# Patient Record
Sex: MALE | Race: OTHER | HISPANIC OR LATINO | URBAN - METROPOLITAN AREA
[De-identification: names, ages, dates, MRNs, and addresses within clinical notes are randomized per-mention and may not be internally consistent; named-entity substitution may affect disease eponyms.]

---

## 2018-09-25 PROBLEM — Z00.00 ENCOUNTER FOR PREVENTIVE HEALTH EXAMINATION: Status: ACTIVE | Noted: 2018-09-25

## 2018-09-26 ENCOUNTER — OUTPATIENT (OUTPATIENT)
Dept: OUTPATIENT SERVICES | Facility: HOSPITAL | Age: 78
LOS: 1 days | End: 2018-09-26
Payer: COMMERCIAL

## 2018-09-26 DIAGNOSIS — I25.10 ATHEROSCLEROTIC HEART DISEASE OF NATIVE CORONARY ARTERY WITHOUT ANGINA PECTORIS: ICD-10-CM

## 2018-09-26 PROCEDURE — 78452 HT MUSCLE IMAGE SPECT MULT: CPT

## 2018-09-26 PROCEDURE — 93016 CV STRESS TEST SUPVJ ONLY: CPT

## 2018-09-26 PROCEDURE — 78452 HT MUSCLE IMAGE SPECT MULT: CPT | Mod: 26

## 2018-09-26 PROCEDURE — 93017 CV STRESS TEST TRACING ONLY: CPT

## 2018-09-26 PROCEDURE — A9500: CPT

## 2018-09-26 PROCEDURE — 93018 CV STRESS TEST I&R ONLY: CPT

## 2018-09-27 ENCOUNTER — APPOINTMENT (OUTPATIENT)
Dept: CV DIAGNOSITCS | Facility: HOSPITAL | Age: 78
End: 2018-09-27

## 2018-09-27 ENCOUNTER — APPOINTMENT (OUTPATIENT)
Dept: CV DIAGNOSTICS | Facility: HOSPITAL | Age: 78
End: 2018-09-27

## 2018-10-25 ENCOUNTER — OUTPATIENT (OUTPATIENT)
Dept: OUTPATIENT SERVICES | Facility: HOSPITAL | Age: 78
LOS: 1 days | End: 2018-10-25
Payer: COMMERCIAL

## 2018-10-25 VITALS
WEIGHT: 210.1 LBS | OXYGEN SATURATION: 98 % | DIASTOLIC BLOOD PRESSURE: 73 MMHG | TEMPERATURE: 98 F | HEIGHT: 67 IN | SYSTOLIC BLOOD PRESSURE: 168 MMHG | RESPIRATION RATE: 20 BRPM | HEART RATE: 82 BPM

## 2018-10-25 DIAGNOSIS — Z98.890 OTHER SPECIFIED POSTPROCEDURAL STATES: Chronic | ICD-10-CM

## 2018-10-25 DIAGNOSIS — R94.39 ABNORMAL RESULT OF OTHER CARDIOVASCULAR FUNCTION STUDY: ICD-10-CM

## 2018-10-25 LAB
ALBUMIN SERPL ELPH-MCNC: 4.7 G/DL — SIGNIFICANT CHANGE UP (ref 3.3–5)
ALP SERPL-CCNC: 70 U/L — SIGNIFICANT CHANGE UP (ref 40–120)
ALT FLD-CCNC: 39 U/L — SIGNIFICANT CHANGE UP (ref 10–45)
ANION GAP SERPL CALC-SCNC: 11 MMOL/L — SIGNIFICANT CHANGE UP (ref 5–17)
AST SERPL-CCNC: 35 U/L — SIGNIFICANT CHANGE UP (ref 10–40)
BILIRUB SERPL-MCNC: 0.5 MG/DL — SIGNIFICANT CHANGE UP (ref 0.2–1.2)
BUN SERPL-MCNC: 13 MG/DL — SIGNIFICANT CHANGE UP (ref 7–23)
CALCIUM SERPL-MCNC: 9.4 MG/DL — SIGNIFICANT CHANGE UP (ref 8.4–10.5)
CHLORIDE SERPL-SCNC: 102 MMOL/L — SIGNIFICANT CHANGE UP (ref 96–108)
CO2 SERPL-SCNC: 27 MMOL/L — SIGNIFICANT CHANGE UP (ref 22–31)
CREAT SERPL-MCNC: 1.16 MG/DL — SIGNIFICANT CHANGE UP (ref 0.5–1.3)
GLUCOSE SERPL-MCNC: 170 MG/DL — HIGH (ref 70–99)
HCT VFR BLD CALC: 37.5 % — LOW (ref 39–50)
HGB BLD-MCNC: 12.6 G/DL — LOW (ref 13–17)
MCHC RBC-ENTMCNC: 30 PG — SIGNIFICANT CHANGE UP (ref 27–34)
MCHC RBC-ENTMCNC: 33.5 GM/DL — SIGNIFICANT CHANGE UP (ref 32–36)
MCV RBC AUTO: 89.6 FL — SIGNIFICANT CHANGE UP (ref 80–100)
PLATELET # BLD AUTO: 226 K/UL — SIGNIFICANT CHANGE UP (ref 150–400)
POTASSIUM SERPL-MCNC: 4.8 MMOL/L — SIGNIFICANT CHANGE UP (ref 3.5–5.3)
POTASSIUM SERPL-SCNC: 4.8 MMOL/L — SIGNIFICANT CHANGE UP (ref 3.5–5.3)
PROT SERPL-MCNC: 7.8 G/DL — SIGNIFICANT CHANGE UP (ref 6–8.3)
RBC # BLD: 4.18 M/UL — LOW (ref 4.2–5.8)
RBC # FLD: 13.3 % — SIGNIFICANT CHANGE UP (ref 10.3–14.5)
SODIUM SERPL-SCNC: 140 MMOL/L — SIGNIFICANT CHANGE UP (ref 135–145)
WBC # BLD: 6 K/UL — SIGNIFICANT CHANGE UP (ref 3.8–10.5)
WBC # FLD AUTO: 6 K/UL — SIGNIFICANT CHANGE UP (ref 3.8–10.5)

## 2018-10-25 PROCEDURE — 93458 L HRT ARTERY/VENTRICLE ANGIO: CPT | Mod: 26,GC

## 2018-10-25 PROCEDURE — 93458 L HRT ARTERY/VENTRICLE ANGIO: CPT

## 2018-10-25 PROCEDURE — C1769: CPT

## 2018-10-25 PROCEDURE — 80053 COMPREHEN METABOLIC PANEL: CPT

## 2018-10-25 PROCEDURE — 85027 COMPLETE CBC AUTOMATED: CPT

## 2018-10-25 PROCEDURE — C1894: CPT

## 2018-10-25 PROCEDURE — C1887: CPT

## 2018-10-25 PROCEDURE — 99203 OFFICE O/P NEW LOW 30 MIN: CPT

## 2018-10-25 PROCEDURE — 93005 ELECTROCARDIOGRAM TRACING: CPT

## 2018-10-25 PROCEDURE — 93010 ELECTROCARDIOGRAM REPORT: CPT

## 2018-10-25 RX ORDER — INSULIN ASPART 100 [IU]/ML
16 INJECTION, SOLUTION SUBCUTANEOUS
Qty: 0 | Refills: 0 | COMMUNITY

## 2018-10-25 RX ORDER — INSULIN DEGLUDEC 100 U/ML
60 INJECTION, SOLUTION SUBCUTANEOUS
Qty: 0 | Refills: 0 | COMMUNITY

## 2018-10-25 RX ORDER — AMLODIPINE BESYLATE 2.5 MG/1
1 TABLET ORAL
Qty: 0 | Refills: 0 | COMMUNITY

## 2018-10-25 RX ORDER — METFORMIN HYDROCHLORIDE 850 MG/1
1 TABLET ORAL
Qty: 0 | Refills: 0 | COMMUNITY

## 2018-10-25 RX ORDER — SODIUM CHLORIDE 9 MG/ML
3 INJECTION INTRAMUSCULAR; INTRAVENOUS; SUBCUTANEOUS EVERY 8 HOURS
Qty: 0 | Refills: 0 | Status: DISCONTINUED | OUTPATIENT
Start: 2018-10-25 | End: 2018-11-09

## 2018-10-25 RX ORDER — ATORVASTATIN CALCIUM 80 MG/1
1 TABLET, FILM COATED ORAL
Qty: 0 | Refills: 0 | COMMUNITY

## 2018-10-25 RX ORDER — RAMIPRIL 5 MG
1 CAPSULE ORAL
Qty: 0 | Refills: 0 | COMMUNITY

## 2018-10-25 RX ORDER — TERAZOSIN HYDROCHLORIDE 10 MG/1
1 CAPSULE ORAL
Qty: 0 | Refills: 0 | COMMUNITY

## 2018-10-25 NOTE — H&P CARDIOLOGY - PMH
Benign prostatic hyperplasia    HLD (hyperlipidemia)    HTN (hypertension), benign    IDDM (insulin dependent diabetes mellitus)

## 2018-10-25 NOTE — H&P CARDIOLOGY - HISTORY OF PRESENT ILLNESS
79 y/o male with PMH of HTN, HLD, IDDM, BPH, now p/w GARZA, denies chest pain or dizziness , s/p stress test which reveals The left ventricle was normal in size. There is a medium-sized, mild defect in the mid to distal inferior  and inferoapical walls that is mostly reversible suggestive of mild ischemia with partial scarring. There is a small, mild defect in the basal to mid anteroseptal wall that is fixed suggestive of scar. Post-stress gated wall motion analysis was performed(LVEF = 64 %;LVEDV = 88 ml.) revealing reduced systolic thickening of the distal inferior, inferoapical, and basal to mid anteroseptal walls with normal overall left ventricular ejection fraction. (ef~64%) seen & evaluated by Dr Miles & now recommends for cardiac cath.

## 2022-12-13 ENCOUNTER — OFFICE (OUTPATIENT)
Dept: URBAN - METROPOLITAN AREA CLINIC 93 | Facility: CLINIC | Age: 82
Setting detail: OPHTHALMOLOGY
End: 2022-12-13
Payer: COMMERCIAL

## 2022-12-13 DIAGNOSIS — H40.1121: ICD-10-CM

## 2022-12-13 DIAGNOSIS — H43.393: ICD-10-CM

## 2022-12-13 DIAGNOSIS — E11.9: ICD-10-CM

## 2022-12-13 DIAGNOSIS — H43.21: ICD-10-CM

## 2022-12-13 DIAGNOSIS — H26.493: ICD-10-CM

## 2022-12-13 DIAGNOSIS — H40.1113: ICD-10-CM

## 2022-12-13 PROCEDURE — 99213 OFFICE O/P EST LOW 20 MIN: CPT | Performed by: OPHTHALMOLOGY

## 2022-12-13 ASSESSMENT — REFRACTION_AUTOREFRACTION
OS_CYLINDER: -0.50
OS_AXIS: 023
OS_SPHERE: +1.00
OD_SPHERE: UNABLE

## 2022-12-13 ASSESSMENT — AXIALLENGTH_DERIVED
OD_AL: 23.31
OD_AL: 25.3244
OS_AL: 23.9363
OS_AL: 24.1385

## 2022-12-13 ASSESSMENT — REFRACTION_CURRENTRX
OS_AXIS: 99
OD_SPHERE: +1.50
OD_ADD: +3.00
OS_ADD: +2.50
OD_OVR_VA: 20/
OS_OVR_VA: 20/
OD_OVR_VA: 20/
OS_VPRISM_DIRECTION: PROGS
OD_SPHERE: +2.00
OS_CYLINDER: -0.50
OS_SPHERE: +2.25
OS_SPHERE: +2.00
OD_ADD: +2.50
OS_ADD: +3.00
OS_AXIS: 95
OD_AXIS: 57
OS_OVR_VA: 20/
OD_VPRISM_DIRECTION: PROGS
OS_CYLINDER: -0.75
OD_CYLINDER: -0.50

## 2022-12-13 ASSESSMENT — REFRACTION_MANIFEST
OS_AXIS: 059
OS_SPHERE: +2.50
OD_AXIS: 045
OD_AXIS: 115
OD_SPHERE: -0.75
OD_CYLINDER: -3.25
OS_SPHERE: +1.50
OD_SPHERE: +2.75
OD_CYLINDER: -0.50
OS_CYLINDER: -0.50

## 2022-12-13 ASSESSMENT — SPHEQUIV_DERIVED
OS_SPHEQUIV: 0.75
OD_SPHEQUIV: 2.5
OD_SPHEQUIV: -2.375
OS_SPHEQUIV: 1.25

## 2022-12-13 ASSESSMENT — KERATOMETRY
OD_AXISANGLE_DEGREES: 115
OS_K1POWER_DIOPTERS: 42.00
OS_AXISANGLE_DEGREES: 093
OD_K1POWER_DIOPTERS: 43.00
OS_K2POWER_DIOPTERS: 40.50
OD_K2POWER_DIOPTERS: 40.25

## 2022-12-13 ASSESSMENT — PACHYMETRY
OS_CT_CORRECTION: -1
OD_CT_UM: 532
OS_CT_UM: 552
OD_CT_CORRECTION: 1

## 2022-12-13 ASSESSMENT — CONFRONTATIONAL VISUAL FIELD TEST (CVF)
OD_FINDINGS: FULL
OS_FINDINGS: FULL

## 2022-12-13 ASSESSMENT — TONOMETRY
OS_IOP_MMHG: 14
OD_IOP_MMHG: 10

## 2022-12-13 ASSESSMENT — VISUAL ACUITY
OD_BCVA: 20/25-1
OS_BCVA: 20/80-1

## 2023-03-24 ENCOUNTER — NON-APPOINTMENT (OUTPATIENT)
Age: 83
End: 2023-03-24

## 2023-04-14 ENCOUNTER — OFFICE (OUTPATIENT)
Facility: LOCATION | Age: 83
Setting detail: OPHTHALMOLOGY
End: 2023-04-14
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
## 2023-04-14 PROCEDURE — NO SHOW FE NO SHOW FEE: Performed by: OPHTHALMOLOGY

## 2024-05-06 ENCOUNTER — OFFICE (OUTPATIENT)
Facility: LOCATION | Age: 84
Setting detail: OPHTHALMOLOGY
End: 2024-05-06
Payer: COMMERCIAL

## 2024-05-06 DIAGNOSIS — H40.1113: ICD-10-CM

## 2024-05-06 DIAGNOSIS — H40.1121: ICD-10-CM

## 2024-05-06 PROCEDURE — 92083 EXTENDED VISUAL FIELD XM: CPT | Performed by: OPHTHALMOLOGY

## 2024-05-06 PROCEDURE — 92250 FUNDUS PHOTOGRAPHY W/I&R: CPT | Performed by: OPHTHALMOLOGY

## 2024-05-06 PROCEDURE — 92020 GONIOSCOPY: CPT | Performed by: OPHTHALMOLOGY

## 2024-05-06 PROCEDURE — 92014 COMPRE OPH EXAM EST PT 1/>: CPT | Performed by: OPHTHALMOLOGY

## 2024-05-06 ASSESSMENT — CONFRONTATIONAL VISUAL FIELD TEST (CVF)
OS_FINDINGS: FULL
OD_FINDINGS: FULL

## 2024-05-20 ENCOUNTER — OFFICE (OUTPATIENT)
Facility: LOCATION | Age: 84
Setting detail: OPHTHALMOLOGY
End: 2024-05-20
Payer: COMMERCIAL

## 2024-05-20 DIAGNOSIS — H40.1113: ICD-10-CM

## 2024-05-20 PROCEDURE — 92012 INTRM OPH EXAM EST PATIENT: CPT | Performed by: OPHTHALMOLOGY

## 2024-05-20 ASSESSMENT — CONFRONTATIONAL VISUAL FIELD TEST (CVF)
OD_FINDINGS: FULL
OS_FINDINGS: FULL

## 2024-06-05 ENCOUNTER — OFFICE (OUTPATIENT)
Facility: LOCATION | Age: 84
Setting detail: OPHTHALMOLOGY
End: 2024-06-05
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
## 2024-06-05 PROCEDURE — NO SHOW FE NO SHOW FEE: Performed by: OPHTHALMOLOGY

## 2024-07-03 ENCOUNTER — OFFICE (OUTPATIENT)
Facility: LOCATION | Age: 84
Setting detail: OPHTHALMOLOGY
End: 2024-07-03
Payer: COMMERCIAL

## 2024-07-03 DIAGNOSIS — H43.811: ICD-10-CM

## 2024-07-03 DIAGNOSIS — E11.9: ICD-10-CM

## 2024-07-03 DIAGNOSIS — H43.21: ICD-10-CM

## 2024-07-03 DIAGNOSIS — H40.1121: ICD-10-CM

## 2024-07-03 DIAGNOSIS — H40.1113: ICD-10-CM

## 2024-07-03 DIAGNOSIS — H43.393: ICD-10-CM

## 2024-07-03 DIAGNOSIS — H26.493: ICD-10-CM

## 2024-07-03 PROCEDURE — 99213 OFFICE O/P EST LOW 20 MIN: CPT | Performed by: OPHTHALMOLOGY

## 2024-07-03 ASSESSMENT — CONFRONTATIONAL VISUAL FIELD TEST (CVF)
OD_FINDINGS: FULL
OS_FINDINGS: FULL

## 2024-07-08 ENCOUNTER — OFFICE (OUTPATIENT)
Dept: URBAN - METROPOLITAN AREA CLINIC 77 | Facility: CLINIC | Age: 84
Setting detail: OPHTHALMOLOGY
End: 2024-07-08
Payer: COMMERCIAL

## 2024-07-08 DIAGNOSIS — H43.12: ICD-10-CM

## 2024-07-08 DIAGNOSIS — E11.3391: ICD-10-CM

## 2024-07-08 DIAGNOSIS — H43.21: ICD-10-CM

## 2024-07-08 DIAGNOSIS — E11.3592: ICD-10-CM

## 2024-07-08 DIAGNOSIS — H40.1113: ICD-10-CM

## 2024-07-08 PROCEDURE — 92235 FLUORESCEIN ANGRPH MLTIFRAME: CPT | Performed by: OPHTHALMOLOGY

## 2024-07-08 PROCEDURE — 92250 FUNDUS PHOTOGRAPHY W/I&R: CPT | Performed by: OPHTHALMOLOGY

## 2024-07-08 PROCEDURE — 67028 INJECTION EYE DRUG: CPT | Mod: LT | Performed by: OPHTHALMOLOGY

## 2024-07-08 PROCEDURE — 99214 OFFICE O/P EST MOD 30 MIN: CPT | Mod: 25 | Performed by: OPHTHALMOLOGY

## 2024-07-08 ASSESSMENT — CONFRONTATIONAL VISUAL FIELD TEST (CVF)
OS_FINDINGS: FULL
OD_FINDINGS: FULL

## 2024-07-19 ENCOUNTER — OFFICE (OUTPATIENT)
Facility: LOCATION | Age: 84
Setting detail: OPHTHALMOLOGY
End: 2024-07-19
Payer: COMMERCIAL

## 2024-07-19 DIAGNOSIS — H40.1121: ICD-10-CM

## 2024-07-19 DIAGNOSIS — H40.1113: ICD-10-CM

## 2024-07-19 PROCEDURE — 92083 EXTENDED VISUAL FIELD XM: CPT | Performed by: OPHTHALMOLOGY

## 2024-07-19 PROCEDURE — 76514 ECHO EXAM OF EYE THICKNESS: CPT | Performed by: OPHTHALMOLOGY

## 2024-07-19 PROCEDURE — 92012 INTRM OPH EXAM EST PATIENT: CPT | Performed by: OPHTHALMOLOGY

## 2024-07-19 PROCEDURE — 92133 CPTRZD OPH DX IMG PST SGM ON: CPT | Performed by: OPHTHALMOLOGY

## 2024-07-19 ASSESSMENT — CONFRONTATIONAL VISUAL FIELD TEST (CVF)
OD_FINDINGS: FULL
OS_FINDINGS: FULL

## 2024-07-22 ENCOUNTER — OFFICE (OUTPATIENT)
Dept: URBAN - METROPOLITAN AREA CLINIC 77 | Facility: CLINIC | Age: 84
Setting detail: OPHTHALMOLOGY
End: 2024-07-22
Payer: COMMERCIAL

## 2024-07-22 ENCOUNTER — RX ONLY (RX ONLY)
Age: 84
End: 2024-07-22

## 2024-07-22 DIAGNOSIS — H43.12: ICD-10-CM

## 2024-07-22 DIAGNOSIS — E11.3592: ICD-10-CM

## 2024-07-22 DIAGNOSIS — E11.3391: ICD-10-CM

## 2024-07-22 PROCEDURE — 92134 CPTRZ OPH DX IMG PST SGM RTA: CPT | Performed by: OPHTHALMOLOGY

## 2024-07-22 PROCEDURE — 67228 TREATMENT X10SV RETINOPATHY: CPT | Mod: LT | Performed by: OPHTHALMOLOGY

## 2024-07-22 ASSESSMENT — CONFRONTATIONAL VISUAL FIELD TEST (CVF)
OS_FINDINGS: FULL
OD_FINDINGS: FULL

## 2024-07-26 ENCOUNTER — OFFICE (OUTPATIENT)
Facility: LOCATION | Age: 84
Setting detail: OPHTHALMOLOGY
End: 2024-07-26
Payer: COMMERCIAL

## 2024-07-26 DIAGNOSIS — E11.3592: ICD-10-CM

## 2024-07-26 DIAGNOSIS — H40.1121: ICD-10-CM

## 2024-07-26 DIAGNOSIS — H43.12: ICD-10-CM

## 2024-07-26 DIAGNOSIS — E11.3391: ICD-10-CM

## 2024-07-26 DIAGNOSIS — H40.1113: ICD-10-CM

## 2024-07-26 PROCEDURE — 99213 OFFICE O/P EST LOW 20 MIN: CPT | Mod: 24 | Performed by: OPHTHALMOLOGY

## 2024-07-26 ASSESSMENT — CONFRONTATIONAL VISUAL FIELD TEST (CVF)
OS_FINDINGS: FULL
OD_FINDINGS: FULL

## 2024-08-26 ENCOUNTER — OFFICE (OUTPATIENT)
Dept: URBAN - METROPOLITAN AREA CLINIC 77 | Facility: CLINIC | Age: 84
Setting detail: OPHTHALMOLOGY
End: 2024-08-26
Payer: COMMERCIAL

## 2024-08-26 DIAGNOSIS — E11.3592: ICD-10-CM

## 2024-08-26 DIAGNOSIS — E11.3391: ICD-10-CM

## 2024-08-26 DIAGNOSIS — H43.12: ICD-10-CM

## 2024-08-26 PROCEDURE — 67228 TREATMENT X10SV RETINOPATHY: CPT | Mod: LT | Performed by: OPHTHALMOLOGY

## 2024-08-26 PROCEDURE — 92250 FUNDUS PHOTOGRAPHY W/I&R: CPT | Performed by: OPHTHALMOLOGY

## 2024-08-26 ASSESSMENT — CONFRONTATIONAL VISUAL FIELD TEST (CVF)
OD_FINDINGS: FULL
OS_FINDINGS: FULL

## 2024-09-16 ENCOUNTER — OFFICE (OUTPATIENT)
Dept: URBAN - METROPOLITAN AREA CLINIC 77 | Facility: CLINIC | Age: 84
Setting detail: OPHTHALMOLOGY
End: 2024-09-16
Payer: COMMERCIAL

## 2024-09-16 DIAGNOSIS — H43.12: ICD-10-CM

## 2024-09-16 DIAGNOSIS — H40.1121: ICD-10-CM

## 2024-09-16 DIAGNOSIS — H40.1113: ICD-10-CM

## 2024-09-16 DIAGNOSIS — E11.3391: ICD-10-CM

## 2024-09-16 DIAGNOSIS — E11.3592: ICD-10-CM

## 2024-09-16 DIAGNOSIS — H26.493: ICD-10-CM

## 2024-09-16 PROCEDURE — 67228 TREATMENT X10SV RETINOPATHY: CPT | Mod: LT | Performed by: OPHTHALMOLOGY

## 2024-09-16 PROCEDURE — 92134 CPTRZ OPH DX IMG PST SGM RTA: CPT | Performed by: OPHTHALMOLOGY

## 2024-09-16 PROCEDURE — 99213 OFFICE O/P EST LOW 20 MIN: CPT | Mod: 25 | Performed by: OPHTHALMOLOGY

## 2024-09-16 ASSESSMENT — CONFRONTATIONAL VISUAL FIELD TEST (CVF)
OS_FINDINGS: FULL
OD_FINDINGS: FULL

## 2024-09-25 ENCOUNTER — OFFICE (OUTPATIENT)
Facility: LOCATION | Age: 84
Setting detail: OPHTHALMOLOGY
End: 2024-09-25
Payer: COMMERCIAL

## 2024-09-25 DIAGNOSIS — H40.1113: ICD-10-CM

## 2024-09-25 DIAGNOSIS — H40.1121: ICD-10-CM

## 2024-09-25 PROCEDURE — 92020 GONIOSCOPY: CPT | Performed by: OPHTHALMOLOGY

## 2024-09-25 PROCEDURE — 99213 OFFICE O/P EST LOW 20 MIN: CPT | Mod: 24 | Performed by: OPHTHALMOLOGY

## 2024-09-25 ASSESSMENT — CONFRONTATIONAL VISUAL FIELD TEST (CVF)
OS_FINDINGS: FULL
OD_FINDINGS: FULL

## 2024-10-01 ENCOUNTER — OFFICE (OUTPATIENT)
Facility: LOCATION | Age: 84
Setting detail: OPHTHALMOLOGY
End: 2024-10-01
Payer: COMMERCIAL

## 2024-10-01 DIAGNOSIS — H40.1121: ICD-10-CM

## 2024-10-01 PROCEDURE — 65855 TRABECULOPLASTY LASER SURG: CPT | Mod: LT | Performed by: OPHTHALMOLOGY

## 2024-10-01 ASSESSMENT — REFRACTION_AUTOREFRACTION
OD_SPHERE: -0.75
OD_CYLINDER: -1.25
OS_SPHERE: +0.75
OD_AXIS: 024
OS_CYLINDER: -0.50
OS_AXIS: 014

## 2024-10-01 ASSESSMENT — KERATOMETRY
OS_K1POWER_DIOPTERS: 40.25
OS_AXISANGLE_DEGREES: 014
OD_K2POWER_DIOPTERS: 42.50
OD_AXISANGLE_DEGREES: 168
METHOD_AUTO_MANUAL: AUTO
OD_K1POWER_DIOPTERS: 41.50
OS_K2POWER_DIOPTERS: 42.25

## 2024-10-01 ASSESSMENT — CONFRONTATIONAL VISUAL FIELD TEST (CVF)
OS_FINDINGS: FULL
OD_FINDINGS: FULL

## 2024-10-01 ASSESSMENT — REFRACTION_CURRENTRX
OD_VPRISM_DIRECTION: PROGS
OD_ADD: +2.50
OS_CYLINDER: -0.50
OD_OVR_VA: 20/
OD_SPHERE: +2.00
OS_OVR_VA: 20/
OS_CYLINDER: -0.75
OD_AXIS: 57
OS_OVR_VA: 20/
OS_SPHERE: +2.00
OS_AXIS: 95
OS_ADD: +2.50
OD_OVR_VA: 20/
OD_ADD: +3.00
OD_CYLINDER: -0.50
OS_ADD: +3.00
OS_SPHERE: +2.25
OS_VPRISM_DIRECTION: PROGS
OD_SPHERE: +1.50
OS_AXIS: 99

## 2024-10-01 ASSESSMENT — REFRACTION_MANIFEST
OD_AXIS: 045
OS_SPHERE: +2.50
OS_AXIS: 059
OD_CYLINDER: -3.25
OS_SPHERE: +1.50
OD_SPHERE: +2.75
OD_AXIS: 115
OS_CYLINDER: -0.50
OD_SPHERE: -0.75
OD_CYLINDER: -0.50

## 2024-10-01 ASSESSMENT — PACHYMETRY
OD_CT_CORRECTION: 2
OD_CT_UM: 510
OS_CT_UM: 516
OS_CT_CORRECTION: 2

## 2024-10-01 ASSESSMENT — TONOMETRY
OS_IOP_MMHG: 14
OD_IOP_MMHG: 16

## 2024-10-01 ASSESSMENT — VISUAL ACUITY
OS_BCVA: 20/150
OD_BCVA: 20/30-2

## 2024-11-04 ENCOUNTER — OFFICE (OUTPATIENT)
Facility: LOCATION | Age: 84
Setting detail: OPHTHALMOLOGY
End: 2024-11-04
Payer: COMMERCIAL

## 2024-11-04 DIAGNOSIS — H40.1121: ICD-10-CM

## 2024-11-04 DIAGNOSIS — H40.1113: ICD-10-CM

## 2024-11-04 PROCEDURE — 99214 OFFICE O/P EST MOD 30 MIN: CPT | Performed by: OPHTHALMOLOGY

## 2024-11-04 ASSESSMENT — KERATOMETRY
OS_K2POWER_DIOPTERS: 42.25
OD_K1POWER_DIOPTERS: 41.25
OS_AXISANGLE_DEGREES: 010
OS_K1POWER_DIOPTERS: 40.50
METHOD_AUTO_MANUAL: AUTO
OD_K2POWER_DIOPTERS: 42.50
OD_AXISANGLE_DEGREES: 008

## 2024-11-04 ASSESSMENT — REFRACTION_CURRENTRX
OS_ADD: +2.50
OD_SPHERE: +2.00
OD_OVR_VA: 20/
OS_OVR_VA: 20/
OD_VPRISM_DIRECTION: PROGS
OS_AXIS: 95
OS_SPHERE: +2.25
OD_CYLINDER: -0.50
OD_ADD: +3.00
OS_ADD: +3.00
OS_AXIS: 99
OS_CYLINDER: -0.75
OD_SPHERE: +1.50
OS_SPHERE: +2.00
OS_VPRISM_DIRECTION: PROGS
OS_CYLINDER: -0.50
OD_ADD: +2.50
OD_OVR_VA: 20/
OD_AXIS: 57
OS_OVR_VA: 20/

## 2024-11-04 ASSESSMENT — REFRACTION_MANIFEST
OD_CYLINDER: -3.25
OD_SPHERE: -0.75
OS_SPHERE: +2.50
OS_SPHERE: +1.50
OD_SPHERE: +2.75
OD_CYLINDER: -0.50
OD_AXIS: 045
OS_CYLINDER: -0.50
OD_AXIS: 115
OS_AXIS: 059

## 2024-11-04 ASSESSMENT — VISUAL ACUITY
OS_BCVA: 20/200
OD_BCVA: 20/25-2

## 2024-11-04 ASSESSMENT — REFRACTION_AUTOREFRACTION
OD_CYLINDER: -0.75
OD_SPHERE: +0.25
OS_CYLINDER: -0.50
OD_AXIS: 113
OS_SPHERE: +1.00
OS_AXIS: 065

## 2024-11-04 ASSESSMENT — CONFRONTATIONAL VISUAL FIELD TEST (CVF)
OD_FINDINGS: FULL
OS_FINDINGS: FULL

## 2024-11-04 ASSESSMENT — PACHYMETRY
OD_CT_CORRECTION: 2
OD_CT_UM: 510
OS_CT_CORRECTION: 2
OS_CT_UM: 516

## 2024-11-04 ASSESSMENT — TONOMETRY: OD_IOP_MMHG: 17

## 2024-12-09 ENCOUNTER — ASC (OUTPATIENT)
Dept: URBAN - METROPOLITAN AREA SURGERY 8 | Facility: SURGERY | Age: 84
Setting detail: OPHTHALMOLOGY
End: 2024-12-09
Payer: COMMERCIAL

## 2024-12-09 DIAGNOSIS — H40.1121: ICD-10-CM

## 2024-12-09 PROCEDURE — 66740 DESTRUCTION CILIARY BODY: CPT | Mod: LT | Performed by: OPHTHALMOLOGY

## 2024-12-09 PROCEDURE — 67255 REINFORCE/GRAFT EYE WALL: CPT | Mod: LT | Performed by: OPHTHALMOLOGY

## 2024-12-10 ENCOUNTER — OFFICE (OUTPATIENT)
Facility: LOCATION | Age: 84
Setting detail: OPHTHALMOLOGY
End: 2024-12-10
Payer: COMMERCIAL

## 2024-12-10 DIAGNOSIS — H40.1121: ICD-10-CM

## 2024-12-10 DIAGNOSIS — H40.1113: ICD-10-CM

## 2024-12-10 DIAGNOSIS — H43.13: ICD-10-CM

## 2024-12-10 PROCEDURE — 92134 CPTRZ OPH DX IMG PST SGM RTA: CPT | Performed by: OPHTHALMOLOGY

## 2024-12-10 PROCEDURE — 99024 POSTOP FOLLOW-UP VISIT: CPT | Performed by: OPHTHALMOLOGY

## 2024-12-10 ASSESSMENT — VISUAL ACUITY
OD_BCVA: 20/25-2
OS_BCVA: 20/HM

## 2024-12-10 ASSESSMENT — REFRACTION_CURRENTRX
OD_SPHERE: +2.00
OS_CYLINDER: -0.50
OD_OVR_VA: 20/
OS_CYLINDER: -0.75
OS_OVR_VA: 20/
OS_AXIS: 99
OD_VPRISM_DIRECTION: PROGS
OD_ADD: +2.50
OS_ADD: +2.50
OD_OVR_VA: 20/
OD_AXIS: 57
OS_OVR_VA: 20/
OD_ADD: +3.00
OD_CYLINDER: -0.50
OS_ADD: +3.00
OS_SPHERE: +2.00
OD_OVR_VA: 20/
OS_OVR_VA: 20/
OS_SPHERE: +2.25
OD_SPHERE: +1.50
OS_AXIS: 95
OS_VPRISM_DIRECTION: PROGS

## 2024-12-10 ASSESSMENT — REFRACTION_MANIFEST
OS_SPHERE: +1.50
OS_CYLINDER: -0.50
OD_CYLINDER: -3.25
OD_CYLINDER: -0.50
OD_SPHERE: -0.75
OS_SPHERE: +2.50
OD_AXIS: 045
OS_AXIS: 059
OD_AXIS: 115
OD_SPHERE: +2.75

## 2024-12-10 ASSESSMENT — KERATOMETRY
OS_K1POWER_DIOPTERS: 40.00
OS_K2POWER_DIOPTERS: 42.00
OS_AXISANGLE_DEGREES: 180
OD_K1POWER_DIOPTERS: 40.75
METHOD_AUTO_MANUAL: AUTO
OD_AXISANGLE_DEGREES: 002
OD_K2POWER_DIOPTERS: 42.50

## 2024-12-10 ASSESSMENT — CONFRONTATIONAL VISUAL FIELD TEST (CVF)
OS_FINDINGS: FULL
OD_FINDINGS: FULL

## 2024-12-10 ASSESSMENT — PACHYMETRY
OD_CT_CORRECTION: 2
OD_CT_UM: 510
OS_CT_CORRECTION: 2
OS_CT_UM: 516

## 2024-12-10 ASSESSMENT — TONOMETRY
OD_IOP_MMHG: 10
OS_IOP_MMHG: 16

## 2024-12-10 ASSESSMENT — REFRACTION_AUTOREFRACTION
OS_AXIS: 080
OD_AXIS: 126
OS_CYLINDER: -0.25
OD_CYLINDER: -1.25
OS_SPHERE: +1.00
OD_SPHERE: +0.75

## 2024-12-16 ENCOUNTER — OFFICE (OUTPATIENT)
Dept: URBAN - METROPOLITAN AREA CLINIC 77 | Facility: CLINIC | Age: 84
Setting detail: OPHTHALMOLOGY
End: 2024-12-16
Payer: COMMERCIAL

## 2024-12-16 DIAGNOSIS — H43.13: ICD-10-CM

## 2024-12-16 DIAGNOSIS — H40.1121: ICD-10-CM

## 2024-12-16 DIAGNOSIS — H40.1113: ICD-10-CM

## 2024-12-16 DIAGNOSIS — E11.3512: ICD-10-CM

## 2024-12-16 DIAGNOSIS — E11.3391: ICD-10-CM

## 2024-12-16 PROCEDURE — 99213 OFFICE O/P EST LOW 20 MIN: CPT | Mod: 25 | Performed by: OPHTHALMOLOGY

## 2024-12-16 PROCEDURE — 92235 FLUORESCEIN ANGRPH MLTIFRAME: CPT | Performed by: OPHTHALMOLOGY

## 2024-12-16 PROCEDURE — 92134 CPTRZ OPH DX IMG PST SGM RTA: CPT | Performed by: OPHTHALMOLOGY

## 2024-12-16 PROCEDURE — 67228 TREATMENT X10SV RETINOPATHY: CPT | Mod: LT | Performed by: OPHTHALMOLOGY

## 2024-12-16 ASSESSMENT — VISUAL ACUITY
OD_BCVA: 20/40
OS_BCVA: NLP

## 2024-12-16 ASSESSMENT — REFRACTION_CURRENTRX
OD_OVR_VA: 20/
OS_OVR_VA: 20/
OD_ADD: +2.50
OS_CYLINDER: -0.75
OS_OVR_VA: 20/
OD_OVR_VA: 20/
OS_CYLINDER: -0.50
OD_CYLINDER: -0.50
OD_SPHERE: +1.50
OS_SPHERE: +2.00
OD_ADD: +3.00
OS_AXIS: 99
OS_ADD: +3.00
OD_VPRISM_DIRECTION: PROGS
OD_OVR_VA: 20/
OS_OVR_VA: 20/
OD_SPHERE: +2.00
OS_ADD: +2.50
OS_AXIS: 95
OD_AXIS: 57
OS_VPRISM_DIRECTION: PROGS
OS_SPHERE: +2.25

## 2024-12-16 ASSESSMENT — KERATOMETRY
OS_K1POWER_DIOPTERS: 40.00
OD_AXISANGLE_DEGREES: 002
OD_K2POWER_DIOPTERS: 42.50
METHOD_AUTO_MANUAL: AUTO
OS_AXISANGLE_DEGREES: 180
OD_K1POWER_DIOPTERS: 40.75
OS_K2POWER_DIOPTERS: 42.00

## 2024-12-16 ASSESSMENT — CONFRONTATIONAL VISUAL FIELD TEST (CVF)
OS_FINDINGS: FULL
OD_FINDINGS: FULL

## 2024-12-16 ASSESSMENT — PACHYMETRY
OS_CT_CORRECTION: 2
OD_CT_UM: 510
OD_CT_CORRECTION: 2
OS_CT_UM: 516

## 2024-12-16 ASSESSMENT — REFRACTION_AUTOREFRACTION
OS_AXIS: 080
OS_SPHERE: +1.00
OS_CYLINDER: -0.25
OD_SPHERE: +0.75
OD_CYLINDER: -1.25
OD_AXIS: 126

## 2024-12-16 ASSESSMENT — REFRACTION_MANIFEST
OD_CYLINDER: -3.25
OS_CYLINDER: -0.50
OD_SPHERE: -0.75
OD_SPHERE: +2.75
OS_SPHERE: +1.50
OD_CYLINDER: -0.50
OD_AXIS: 045
OD_AXIS: 115
OS_SPHERE: +2.50
OS_AXIS: 059

## 2024-12-16 ASSESSMENT — TONOMETRY
OS_IOP_MMHG: 20
OD_IOP_MMHG: 20

## 2024-12-31 ENCOUNTER — OFFICE (OUTPATIENT)
Facility: LOCATION | Age: 84
Setting detail: OPHTHALMOLOGY
End: 2024-12-31
Payer: COMMERCIAL

## 2024-12-31 DIAGNOSIS — H40.1113: ICD-10-CM

## 2024-12-31 PROBLEM — H26.492 POSTERIOR CAPSULAR OPACIFICATION;  , LEFT EYE: Status: ACTIVE | Noted: 2024-12-10

## 2024-12-31 PROBLEM — H43.13 VITREOUS HEMORRHAGE; BOTH EYES: Status: ACTIVE | Noted: 2024-12-10

## 2024-12-31 PROBLEM — E11.3391 DM TYPE 2; RIGHT MOD WITHOUT ME, LEFT PROLIFERATIVE WITH ME: Status: ACTIVE | Noted: 2024-12-16

## 2024-12-31 PROBLEM — E11.3512 DM TYPE 2; RIGHT MOD WITHOUT ME, LEFT PROLIFERATIVE WITH ME: Status: ACTIVE | Noted: 2024-12-16

## 2024-12-31 PROBLEM — H43.391 VITREOUS FLOATERS ;  , RIGHT EYE: Status: ACTIVE | Noted: 2024-12-10

## 2024-12-31 PROCEDURE — 99024 POSTOP FOLLOW-UP VISIT: CPT | Performed by: OPHTHALMOLOGY

## 2024-12-31 ASSESSMENT — TONOMETRY
OD_IOP_MMHG: 11
OS_IOP_MMHG: 14

## 2024-12-31 ASSESSMENT — VISUAL ACUITY
OS_BCVA: 250
OD_BCVA: 20/40+2

## 2024-12-31 ASSESSMENT — REFRACTION_CURRENTRX
OS_OVR_VA: 20/
OD_AXIS: 57
OD_VPRISM_DIRECTION: PROGS
OS_OVR_VA: 20/
OS_VPRISM_DIRECTION: PROGS
OD_OVR_VA: 20/
OS_ADD: +2.50
OD_ADD: +3.00
OS_OVR_VA: 20/
OD_OVR_VA: 20/
OS_AXIS: 99
OD_OVR_VA: 20/
OD_ADD: +2.50
OD_CYLINDER: -0.50
OS_SPHERE: +2.00
OS_AXIS: 95
OS_CYLINDER: -0.50
OS_SPHERE: +2.25
OD_SPHERE: +2.00
OS_CYLINDER: -0.75
OD_SPHERE: +1.50
OS_ADD: +3.00

## 2024-12-31 ASSESSMENT — REFRACTION_MANIFEST
OS_AXIS: 059
OD_AXIS: 045
OS_SPHERE: +2.50
OD_CYLINDER: -3.25
OD_AXIS: 115
OD_CYLINDER: -0.50
OD_SPHERE: +2.75
OS_SPHERE: +1.50
OS_CYLINDER: -0.50
OD_SPHERE: -0.75

## 2024-12-31 ASSESSMENT — PACHYMETRY
OD_CT_CORRECTION: 2
OS_CT_CORRECTION: 2
OD_CT_UM: 510
OS_CT_UM: 516

## 2024-12-31 ASSESSMENT — CONFRONTATIONAL VISUAL FIELD TEST (CVF)
OD_FINDINGS: FULL
OS_FINDINGS: FULL

## 2025-04-16 ENCOUNTER — OFFICE (OUTPATIENT)
Dept: URBAN - METROPOLITAN AREA CLINIC 77 | Facility: CLINIC | Age: 85
Setting detail: OPHTHALMOLOGY
End: 2025-04-16
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
## 2025-04-16 PROCEDURE — NO SHOW FE NO SHOW FEE: Performed by: OPHTHALMOLOGY

## 2025-04-29 ENCOUNTER — OFFICE (OUTPATIENT)
Facility: LOCATION | Age: 85
Setting detail: OPHTHALMOLOGY
End: 2025-04-29
Payer: COMMERCIAL

## 2025-04-29 DIAGNOSIS — H40.1121: ICD-10-CM

## 2025-04-29 DIAGNOSIS — H40.1113: ICD-10-CM

## 2025-04-29 PROCEDURE — 99213 OFFICE O/P EST LOW 20 MIN: CPT | Performed by: OPHTHALMOLOGY

## 2025-04-29 ASSESSMENT — REFRACTION_MANIFEST
OD_CYLINDER: -0.50
OD_AXIS: 115
OS_SPHERE: +2.50
OS_SPHERE: +1.50
OD_SPHERE: +2.75
OS_CYLINDER: -0.50
OD_SPHERE: -0.75
OS_AXIS: 059
OD_AXIS: 045
OD_CYLINDER: -3.25

## 2025-04-29 ASSESSMENT — KERATOMETRY
OD_AXISANGLE_DEGREES: 179
OD_K1POWER_DIOPTERS: 40.75
OS_AXISANGLE_DEGREES: 007
METHOD_AUTO_MANUAL: AUTO
OS_K1POWER_DIOPTERS: 40.50
OS_K2POWER_DIOPTERS: 41.75
OD_K2POWER_DIOPTERS: 42.50

## 2025-04-29 ASSESSMENT — PACHYMETRY
OD_CT_UM: 510
OS_CT_CORRECTION: 2
OS_CT_UM: 516
OD_CT_CORRECTION: 2

## 2025-04-29 ASSESSMENT — TONOMETRY: OD_IOP_MMHG: 18

## 2025-04-29 ASSESSMENT — REFRACTION_CURRENTRX
OS_VPRISM_DIRECTION: PROGS
OS_CYLINDER: -0.50
OD_CYLINDER: -0.50
OS_ADD: +2.50
OD_SPHERE: +1.50
OS_ADD: +3.00
OD_VPRISM_DIRECTION: PROGS
OS_OVR_VA: 20/
OS_OVR_VA: 20/
OD_OVR_VA: 20/
OD_OVR_VA: 20/
OS_AXIS: 95
OD_OVR_VA: 20/
OS_SPHERE: +2.00
OD_SPHERE: +2.00
OS_CYLINDER: -0.75
OS_AXIS: 99
OD_ADD: +2.50
OD_ADD: +3.00
OD_AXIS: 57
OS_OVR_VA: 20/
OS_SPHERE: +2.25

## 2025-04-29 ASSESSMENT — VISUAL ACUITY
OD_BCVA: 20/25-2
OS_BCVA: 200-1

## 2025-04-29 ASSESSMENT — CONFRONTATIONAL VISUAL FIELD TEST (CVF)
OD_FINDINGS: FULL
OS_FINDINGS: FULL

## 2025-04-29 ASSESSMENT — REFRACTION_AUTOREFRACTION
OS_SPHERE: +1.25
OD_CYLINDER: -0.50
OD_AXIS: 123
OS_AXIS: 034
OS_CYLINDER: -0.50
OD_SPHERE: +0.50

## 2025-05-08 ENCOUNTER — OFFICE (OUTPATIENT)
Facility: LOCATION | Age: 85
Setting detail: OPHTHALMOLOGY
End: 2025-05-08
Payer: COMMERCIAL

## 2025-05-08 DIAGNOSIS — H40.1121: ICD-10-CM

## 2025-05-08 DIAGNOSIS — H40.1113: ICD-10-CM

## 2025-05-08 PROCEDURE — 92012 INTRM OPH EXAM EST PATIENT: CPT | Performed by: OPHTHALMOLOGY

## 2025-05-08 ASSESSMENT — REFRACTION_AUTOREFRACTION
OD_CYLINDER: -0.50
OD_AXIS: 130
OD_SPHERE: +0.75
OS_CYLINDER: -0.50
OS_AXIS: 039
OS_SPHERE: +0.75

## 2025-05-08 ASSESSMENT — TONOMETRY
OD_IOP_MMHG: 10
OS_IOP_MMHG: 14

## 2025-05-08 ASSESSMENT — REFRACTION_MANIFEST
OD_AXIS: 115
OS_CYLINDER: -0.50
OS_SPHERE: +1.50
OD_AXIS: 045
OS_SPHERE: +2.50
OD_SPHERE: -0.75
OS_AXIS: 059
OD_SPHERE: +2.75
OD_CYLINDER: -3.25
OD_CYLINDER: -0.50

## 2025-05-08 ASSESSMENT — REFRACTION_CURRENTRX
OS_SPHERE: +2.00
OD_ADD: +2.50
OS_SPHERE: +2.25
OD_AXIS: 57
OD_OVR_VA: 20/
OD_ADD: +3.00
OS_CYLINDER: -0.75
OD_SPHERE: +2.00
OS_CYLINDER: -0.50
OS_OVR_VA: 20/
OS_AXIS: 95
OD_CYLINDER: -0.50
OD_VPRISM_DIRECTION: PROGS
OS_VPRISM_DIRECTION: PROGS
OS_AXIS: 99
OD_SPHERE: +1.50
OD_OVR_VA: 20/
OS_OVR_VA: 20/
OD_OVR_VA: 20/
OS_ADD: +2.50
OS_OVR_VA: 20/
OS_ADD: +3.00

## 2025-05-08 ASSESSMENT — PACHYMETRY
OD_CT_UM: 510
OS_CT_CORRECTION: 2
OD_CT_CORRECTION: 2
OS_CT_UM: 516

## 2025-05-08 ASSESSMENT — KERATOMETRY
OD_K1POWER_DIOPTERS: 40.75
OD_K2POWER_DIOPTERS: 42.50
OD_AXISANGLE_DEGREES: 179
OS_K1POWER_DIOPTERS: 40.50
OS_AXISANGLE_DEGREES: 007
OS_K2POWER_DIOPTERS: 41.75
METHOD_AUTO_MANUAL: AUTO

## 2025-05-08 ASSESSMENT — CONFRONTATIONAL VISUAL FIELD TEST (CVF)
OD_FINDINGS: FULL
OS_FINDINGS: FULL

## 2025-05-08 ASSESSMENT — VISUAL ACUITY
OD_BCVA: 20/25-2
OS_BCVA: 600

## 2025-06-20 ENCOUNTER — RX ONLY (RX ONLY)
Age: 85
End: 2025-06-20

## 2025-06-20 ENCOUNTER — OFFICE (OUTPATIENT)
Facility: LOCATION | Age: 85
Setting detail: OPHTHALMOLOGY
End: 2025-06-20
Payer: COMMERCIAL

## 2025-06-20 DIAGNOSIS — H40.1121: ICD-10-CM

## 2025-06-20 DIAGNOSIS — H26.492: ICD-10-CM

## 2025-06-20 DIAGNOSIS — H40.1113: ICD-10-CM

## 2025-06-20 PROCEDURE — 99213 OFFICE O/P EST LOW 20 MIN: CPT | Performed by: OPHTHALMOLOGY

## 2025-06-20 ASSESSMENT — REFRACTION_MANIFEST
OD_CYLINDER: -3.25
OS_CYLINDER: -0.50
OS_SPHERE: +2.50
OD_SPHERE: -0.75
OS_SPHERE: +1.50
OD_CYLINDER: -0.50
OS_AXIS: 059
OD_AXIS: 045
OD_SPHERE: +2.75
OD_AXIS: 115

## 2025-06-20 ASSESSMENT — REFRACTION_CURRENTRX
OS_CYLINDER: -0.50
OD_OVR_VA: 20/
OD_VPRISM_DIRECTION: PROGS
OD_CYLINDER: -0.50
OD_ADD: +3.00
OS_OVR_VA: 20/
OD_AXIS: 57
OS_OVR_VA: 20/
OD_SPHERE: +1.50
OD_OVR_VA: 20/
OS_ADD: +2.50
OS_AXIS: 99
OD_SPHERE: +2.00
OS_AXIS: 95
OS_OVR_VA: 20/
OS_CYLINDER: -0.75
OD_OVR_VA: 20/
OS_SPHERE: +2.25
OS_ADD: +3.00
OS_VPRISM_DIRECTION: PROGS
OS_SPHERE: +2.00
OD_ADD: +2.50

## 2025-06-20 ASSESSMENT — REFRACTION_AUTOREFRACTION
OS_SPHERE: +1.50
OD_AXIS: 051
OD_CYLINDER: -2.25
OS_AXIS: 036
OS_CYLINDER: -0.75
OD_SPHERE: -0.25

## 2025-06-20 ASSESSMENT — KERATOMETRY
OS_AXISANGLE_DEGREES: 007
OD_K2POWER_DIOPTERS: 42.50
OD_AXISANGLE_DEGREES: 179
OD_K1POWER_DIOPTERS: 40.75
OS_K1POWER_DIOPTERS: 40.50
OS_K2POWER_DIOPTERS: 41.75
METHOD_AUTO_MANUAL: AUTO

## 2025-06-20 ASSESSMENT — VISUAL ACUITY
OS_BCVA: 20/200
OD_BCVA: 20/25-3

## 2025-06-20 ASSESSMENT — TONOMETRY
OS_IOP_MMHG: 19
OD_IOP_MMHG: 19

## 2025-06-20 ASSESSMENT — PACHYMETRY
OS_CT_UM: 516
OD_CT_CORRECTION: 2
OD_CT_UM: 510
OS_CT_CORRECTION: 2

## 2025-06-20 ASSESSMENT — CONFRONTATIONAL VISUAL FIELD TEST (CVF)
OD_FINDINGS: FULL
OS_FINDINGS: FULL

## 2025-07-22 ENCOUNTER — OFFICE (OUTPATIENT)
Facility: LOCATION | Age: 85
Setting detail: OPHTHALMOLOGY
End: 2025-07-22
Payer: COMMERCIAL

## 2025-07-22 DIAGNOSIS — H40.1113: ICD-10-CM

## 2025-07-22 DIAGNOSIS — H40.1121: ICD-10-CM

## 2025-07-22 PROCEDURE — 99213 OFFICE O/P EST LOW 20 MIN: CPT | Performed by: OPHTHALMOLOGY

## 2025-07-22 ASSESSMENT — KERATOMETRY
OS_K2POWER_DIOPTERS: 42.25
METHOD_AUTO_MANUAL: AUTO
OD_K2POWER_DIOPTERS: 42.25
OS_K1POWER_DIOPTERS: 40.75
OD_K1POWER_DIOPTERS: 39.50
OD_AXISANGLE_DEGREES: 162
OS_AXISANGLE_DEGREES: 008

## 2025-07-22 ASSESSMENT — REFRACTION_CURRENTRX
OS_CYLINDER: -0.75
OD_CYLINDER: -0.50
OD_OVR_VA: 20/
OS_AXIS: 95
OS_ADD: +2.50
OS_OVR_VA: 20/
OS_OVR_VA: 20/
OD_SPHERE: +2.00
OS_SPHERE: +2.00
OD_ADD: +2.50
OS_AXIS: 99
OD_OVR_VA: 20/
OD_SPHERE: +1.50
OD_OVR_VA: 20/
OD_VPRISM_DIRECTION: PROGS
OS_SPHERE: +2.25
OD_AXIS: 57
OS_ADD: +3.00
OD_ADD: +3.00
OS_VPRISM_DIRECTION: PROGS
OS_OVR_VA: 20/
OS_CYLINDER: -0.50

## 2025-07-22 ASSESSMENT — REFRACTION_AUTOREFRACTION
OD_CYLINDER: -2.00
OS_AXIS: 017
OD_AXIS: 107
OS_SPHERE: +1.00
OS_CYLINDER: -0.50
OD_SPHERE: +1.75

## 2025-07-22 ASSESSMENT — REFRACTION_MANIFEST
OS_CYLINDER: -0.50
OS_SPHERE: +1.50
OD_CYLINDER: -3.25
OD_SPHERE: -0.75
OS_SPHERE: +2.50
OD_AXIS: 115
OD_CYLINDER: -0.50
OD_AXIS: 045
OS_AXIS: 059
OD_SPHERE: +2.75

## 2025-07-22 ASSESSMENT — PACHYMETRY
OS_CT_UM: 516
OD_CT_CORRECTION: 2
OD_CT_UM: 510
OS_CT_CORRECTION: 2

## 2025-07-22 ASSESSMENT — TONOMETRY
OS_IOP_MMHG: 14
OD_IOP_MMHG: 12

## 2025-07-22 ASSESSMENT — CONFRONTATIONAL VISUAL FIELD TEST (CVF)
OD_FINDINGS: FULL
OS_FINDINGS: FULL

## 2025-07-22 ASSESSMENT — VISUAL ACUITY
OS_BCVA: 20/150-
OD_BCVA: 20/25+